# Patient Record
Sex: FEMALE | Race: BLACK OR AFRICAN AMERICAN | URBAN - METROPOLITAN AREA
[De-identification: names, ages, dates, MRNs, and addresses within clinical notes are randomized per-mention and may not be internally consistent; named-entity substitution may affect disease eponyms.]

---

## 2023-06-07 ENCOUNTER — HOSPITAL ENCOUNTER (OUTPATIENT)
Dept: LAB | Age: 33
Setting detail: SPECIMEN
Discharge: HOME | End: 2023-06-10

## 2023-06-07 PROCEDURE — 86735 MUMPS ANTIBODY: CPT

## 2023-06-07 PROCEDURE — 86762 RUBELLA ANTIBODY: CPT

## 2023-06-07 PROCEDURE — 86765 RUBEOLA ANTIBODY: CPT

## 2023-06-07 PROCEDURE — 86787 VARICELLA-ZOSTER ANTIBODY: CPT

## 2023-08-19 ENCOUNTER — APPOINTMENT (OUTPATIENT)
Dept: GENERAL RADIOLOGY | Age: 33
End: 2023-08-19
Payer: COMMERCIAL

## 2023-08-19 ENCOUNTER — HOSPITAL ENCOUNTER (EMERGENCY)
Age: 33
Discharge: HOME | End: 2023-08-19
Attending: EMERGENCY MEDICINE | Admitting: EMERGENCY MEDICINE
Payer: COMMERCIAL

## 2023-08-19 VITALS
HEIGHT: 62 IN | BODY MASS INDEX: 31.83 KG/M2 | DIASTOLIC BLOOD PRESSURE: 60 MMHG | OXYGEN SATURATION: 99 % | RESPIRATION RATE: 16 BRPM | TEMPERATURE: 98.6 F | HEART RATE: 98 BPM | SYSTOLIC BLOOD PRESSURE: 124 MMHG | WEIGHT: 173 LBS

## 2023-08-19 DIAGNOSIS — M25.461 KNEE EFFUSION, RIGHT: Primary | ICD-10-CM

## 2023-08-19 PROCEDURE — 99283 EMERGENCY DEPT VISIT LOW MDM: CPT

## 2023-08-19 PROCEDURE — 73562 X-RAY EXAM OF KNEE 3: CPT

## 2023-08-19 RX ORDER — PREDNISONE 20 MG/1
20 TABLET ORAL 2 TIMES DAILY
Qty: 10 TABLET | Refills: 0 | Status: SHIPPED | OUTPATIENT
Start: 2023-08-19 | End: 2023-08-24

## 2023-08-19 ASSESSMENT — ENCOUNTER SYMPTOMS
ABDOMINAL PAIN: 0
VOMITING: 0
NAUSEA: 0
SHORTNESS OF BREATH: 0
COUGH: 0

## 2023-08-19 ASSESSMENT — PAIN SCALES - GENERAL: PAINLEVEL_OUTOF10: 6

## 2023-08-19 ASSESSMENT — PAIN - FUNCTIONAL ASSESSMENT: PAIN_FUNCTIONAL_ASSESSMENT: 0-10

## 2023-08-19 NOTE — ED PROVIDER NOTES
Emergency Department Provider Note       PCP: No primary care provider on file. Age: 29 y.o. Sex: female     DISPOSITION Decision To Discharge 08/19/2023 05:13:02 PM       ICD-10-CM    1. Knee effusion, right  M25.461 [address] [place] [address] Dr Flaco Craig of Problems Addressed:  1 or more acute illnesses that pose a threat to life or bodily function. Data Reviewed and Analyzed:   I independently ordered and reviewed each unique test.   I interpreted the X-rays no bony abnormality seen on x-ray of patient's knee. Joint effusion noted on radiologist interpretation. Discussion of management or test interpretation. This patient is an otherwise healthy 53-year-old female who presents today due to right knee pain that started about 3 days ago. Patient has not had any injury, trauma, or falls to cause this knee pain. Patient is able to ambulate and flex at the knee despite the pain. No significant edema appreciated on physical exam.  She is negative Lachman's and posterior drawer test.  X-ray imaging shows a joint effusion. I have low suspicion for septic arthritis as patient is still able to flex and extend at the knee without difficulty and has no history of trauma or surgery to this knee. At this time, as there is no significant edema noted on physical exam, I will treat the patient with crutches and give a referral to orthopedics for close follow-up. Strict return precautions were given and we discussed conservative care measures. She verbalized understanding agreement with the plan. Risk of Complications and/or Morbidity of Patient Management:  Shared medical decision making was utilized in creating the patients health plan today.     ED Course as of 08/19/23 1713   Sat Aug 19, 2023   1639 No acute abnormality on x-ray imaging of patient's right knee as interpreted by me. [KS]   1650 IMPRESSION:  Joint fluid with no

## 2023-08-19 NOTE — ED TRIAGE NOTES
Patient reports right knee pain x 2 weeks. Patient reports less pain at rest but feels joint is stiffer after rest. Patient denies any injury or trauma, states she had pain in left knee first, then both knees and now just in right knee.

## 2023-08-19 NOTE — DISCHARGE INSTRUCTIONS
Rest, ice, and elevate your knee to help decrease swelling and pain. Alternate Tylenol and ibuprofen to help with pain. Follow-up with orthopedics for close reevaluation. If your symptoms change or worsen, return immediately to the emergency department.